# Patient Record
Sex: MALE | Race: OTHER | Employment: FULL TIME | URBAN - METROPOLITAN AREA
[De-identification: names, ages, dates, MRNs, and addresses within clinical notes are randomized per-mention and may not be internally consistent; named-entity substitution may affect disease eponyms.]

---

## 2023-07-29 ENCOUNTER — HOSPITAL ENCOUNTER (EMERGENCY)
Age: 58
Discharge: HOME | End: 2023-07-29
Attending: EMERGENCY MEDICINE
Payer: COMMERCIAL

## 2023-07-29 VITALS
BODY MASS INDEX: 26.21 KG/M2 | TEMPERATURE: 97.8 F | OXYGEN SATURATION: 98 % | HEART RATE: 98 BPM | HEIGHT: 67 IN | SYSTOLIC BLOOD PRESSURE: 130 MMHG | RESPIRATION RATE: 16 BRPM | WEIGHT: 167 LBS | DIASTOLIC BLOOD PRESSURE: 78 MMHG

## 2023-07-29 DIAGNOSIS — S60.562A INSECT BITE (NONVENOMOUS) OF LEFT HAND, INITIAL ENCOUNTER: Primary | ICD-10-CM
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
## 2023-07-29 PROCEDURE — 99284 EMERGENCY DEPT VISIT MOD MDM: CPT

## 2023-07-29 PROCEDURE — 96372 THER/PROPH/DIAG INJ SC/IM: CPT

## 2023-07-29 PROCEDURE — 6360000002 HC RX W HCPCS: Performed by: PHYSICIAN ASSISTANT

## 2023-07-29 RX ORDER — PREDNISONE 50 MG/1
50 TABLET ORAL DAILY
Qty: 5 TABLET | Refills: 0 | Status: SHIPPED | OUTPATIENT
Start: 2023-07-29 | End: 2023-08-03

## 2023-07-29 RX ORDER — DEXAMETHASONE SODIUM PHOSPHATE 10 MG/ML
8 INJECTION INTRAMUSCULAR; INTRAVENOUS
Status: COMPLETED | OUTPATIENT
Start: 2023-07-29 | End: 2023-07-29

## 2023-07-29 RX ORDER — HYDROXYZINE PAMOATE 25 MG/1
25 CAPSULE ORAL 3 TIMES DAILY PRN
Qty: 21 CAPSULE | Refills: 0 | Status: SHIPPED | OUTPATIENT
Start: 2023-07-29 | End: 2023-08-05

## 2023-07-29 RX ADMIN — DEXAMETHASONE SODIUM PHOSPHATE 8 MG: 10 INJECTION INTRAMUSCULAR; INTRAVENOUS at 10:21

## 2023-07-29 ASSESSMENT — PAIN - FUNCTIONAL ASSESSMENT: PAIN_FUNCTIONAL_ASSESSMENT: 0-10

## 2023-07-29 ASSESSMENT — PAIN SCALES - GENERAL: PAINLEVEL_OUTOF10: 0

## 2023-07-29 NOTE — ED TRIAGE NOTES
Patient advises he was building a deck yesterday and stirred up a yellow jacket nest and was stung multiple times, worse to left hand with redness, swelling and heat present, also to left knee with sting, no redness, swelling or heat present. No shortness of breath.

## 2023-07-29 NOTE — ED PROVIDER NOTES
Emergency Department Provider Note       PCP: No primary care provider on file. Age: 60 y.o. Sex: male     DISPOSITION Discharge - Pending Orders Complete 07/29/2023 10:20:29 AM       ICD-10-CM    1. Insect bite (nonvenomous) of left hand, initial encounter  U52.607M     W57. [facility]           Medical Decision Making     Complexity of Problems Addressed:  Complexity of Problem: 1 acute, uncomplicated illness or injury. Data Reviewed and Analyzed:  Category 1:   I independently ordered and reviewed each unique test.      Category 3: Discussion of management or test interpretation. 29-year-old male presenting to the emergency department today for evaluation of insect sting to the left hand over 24 hours ago. States that the hand became swollen and very itchy last night. No shortness of breath, chest pain, facial or tongue swelling, no signs of angioedema on exam.  Vital signs are stable. The left hand is erythematous, swollen with some mild warmth. There is no open wounds noted. I do not suspect infection, symptoms likely reactive and allergic in etiology. We will treat with steroids, Vistaril for itching. Patient in agreement with this. He will return for any new or worsening symptoms. Risk of Complications and/or Morbidity of Patient Management:  Prescription drug management performed and Shared medical decision making was utilized in creating the patients health plan today. History     Vanna Staley is a 60 y.o. male who presents to the Emergency Department with chief complaint of    Chief Complaint   Patient presents with    Insect Bite      29-year-old male presents to the emergency department today for evaluation of insect bite to the left hand. He states that it occurred yesterday morning at about 9:30 AM, he was working on a deck and was stung by several bees or yellow jackets. States he had some minimal pain at the time but last night his left hand became very itchy and swollen. were placed in this encounter. Medications   dexamethasone (DECADRON) injection 8 mg (has no administration in time range)       New Prescriptions    HYDROXYZINE PAMOATE (VISTARIL) 25 MG CAPSULE    Take 1 capsule by mouth 3 times daily as needed for Itching    PREDNISONE (DELTASONE) 50 MG TABLET    Take 1 tablet by mouth daily for 5 days        No past medical history on file. No past surgical history on file. No results found for any visits on 07/29/23. No orders to display         Voice dictation software was used during the making of this note. This software is not perfect and grammatical and other typographical errors may be present. This note has not been completely proofread for errors.       [place], [place]  07/29/23 [address] [place], [place]  07/29/23 [facility]

## 2023-07-29 NOTE — DISCHARGE INSTRUCTIONS
Take prednisone as prescribed, this will help with itching and swelling. Take vistaril as needed for itching. Apply ice to help with swelling. Return to the ER for any new or worsening symptoms.

## 2023-12-08 ENCOUNTER — HOSPITAL ENCOUNTER (EMERGENCY)
Age: 58
Discharge: HOME | End: 2023-12-08
Payer: COMMERCIAL

## 2023-12-08 VITALS
RESPIRATION RATE: 18 BRPM | HEIGHT: 67 IN | DIASTOLIC BLOOD PRESSURE: 89 MMHG | OXYGEN SATURATION: 97 % | WEIGHT: 154.32 LBS | TEMPERATURE: 99.3 F | SYSTOLIC BLOOD PRESSURE: 124 MMHG | BODY MASS INDEX: 24.22 KG/M2 | HEART RATE: 83 BPM

## 2023-12-08 DIAGNOSIS — M79.644 FINGER PAIN, RIGHT: Primary | ICD-10-CM

## 2023-12-08 PROCEDURE — 99283 EMERGENCY DEPT VISIT LOW MDM: CPT

## 2023-12-08 RX ORDER — NAPROXEN 500 MG/1
500 TABLET ORAL 2 TIMES DAILY
Qty: 14 TABLET | Refills: 0 | Status: SHIPPED | OUTPATIENT
Start: 2023-12-08 | End: 2023-12-15

## 2023-12-08 RX ORDER — CEPHALEXIN 500 MG/1
500 CAPSULE ORAL 3 TIMES DAILY
Qty: 28 CAPSULE | Refills: 0 | Status: SHIPPED | OUTPATIENT
Start: 2023-12-08 | End: 2023-12-15

## 2023-12-08 ASSESSMENT — PAIN - FUNCTIONAL ASSESSMENT: PAIN_FUNCTIONAL_ASSESSMENT: 0-10

## 2023-12-08 ASSESSMENT — PAIN SCALES - GENERAL: PAINLEVEL_OUTOF10: 7

## 2023-12-08 ASSESSMENT — ENCOUNTER SYMPTOMS: COLOR CHANGE: 0

## 2023-12-08 NOTE — ED TRIAGE NOTES
Patient ambulatory to the ER. Patient c\o splinter in his R third finger 2 weeks ago. States this occurred after moving wood. Patient c\o increase pain and swelling.

## 2023-12-08 NOTE — DISCHARGE INSTRUCTIONS
The body should work to self inject the small piece of wood if there is a fragment remaining in the finger. Take the antibiotic and anti-inflammatory as prescribed. Keep clean with soap and water. You can attempt removing with tweezers should you see the object come to the surface of the skin.   Return to the ED as needed for new or worsening symptoms

## 2023-12-08 NOTE — ED PROVIDER NOTES
[facility] [facility]  Emergency Department    DISPOSITION Decision To Discharge 12/08/2023 01:31:16 PM       ICD-10-CM    1. Finger pain, right  M79.644         ED Course     ED Course as of 12/08/23 1337   Fri Dec 08, 2023   1330 Patient is a 59-year-old male who presents concerned that he has a possible wood splinter in the palmar aspect of the proximal third digit right hand. No obvious evidence of infection on exam.  No obvious foreign body appreciated. Discussed with the patient that the body should work to self inject a small splinter. He states he understands he has had that happen in the past.  Out of caution we will cover him with some Keflex and give him some anti-inflammatories for discomfort. Encouraged him to return back for worsening swelling, pain, or other symptoms of the hand. Patient understanding and agreeable with this treatment plan. Patient will be discharged. [TT]      ED Course User Index  [TT] Claude Schroeder PA-C     Complexity of Problems Addressed:  1 acute, stable illness    Data Reviewed and Analyzed:  Category 1:   I ordered each unique test.  I interpreted the results of each unique test.      Category 2:     Category 3: Discussion of management or test interpretation. Patient is a 59-year-old male with concern for possible retained fragment of wood sliver in the right third finger. No obvious evidence of infection here today or abscess that is developed needing I&D. Discussed with patient this should likely resolve itself and the body should work to self inject any possible retained material in the finger. Patient reports he has had that happen in the past and is understanding with the process. No imaging today would be beneficial in changing treatment plan. Out of caution we will cover him with Keflex and some anti-inflammatories for discomfort. Discussed return precautions back to the ED for new or worsening symptoms with the patient reports understanding of.